# Patient Record
Sex: FEMALE | Race: BLACK OR AFRICAN AMERICAN | NOT HISPANIC OR LATINO | Employment: FULL TIME | URBAN - METROPOLITAN AREA
[De-identification: names, ages, dates, MRNs, and addresses within clinical notes are randomized per-mention and may not be internally consistent; named-entity substitution may affect disease eponyms.]

---

## 2023-10-09 ENCOUNTER — TELEPHONE (OUTPATIENT)
Dept: NEUROLOGY | Facility: CLINIC | Age: 28
End: 2023-10-09

## 2023-10-09 NOTE — TELEPHONE ENCOUNTER
Caller: Pumphrey Caroline    Relationship: Self    Best call back number: [phone]    What was the call regarding: PT MISSED A PHONE CALL FROM DEVYN REGARDING RESCHEDULING HER NEW PATIENT APPT W/ DR. SALCIDO THIS MORNING. PT STATES THE VM SHE RECEIVED WAS UNCLEAR IF SHE NEEDED TO CALL DEVYN BACK TO RESCHEDULE OR IF SHE WOULD RECEIVE A CALL BACK WITH A NEW APPT DATE.    PER SANCHEZ, SENDING ENCOUNTER TO DETERMINE NEXT STEPS.    Do you require a callback: YES, PLEASE.    PLEASE REVIEW AND ADVISE.

## 2023-10-27 ENCOUNTER — OFFICE VISIT (OUTPATIENT)
Dept: NEUROLOGY | Facility: CLINIC | Age: 28
End: 2023-10-27
Payer: COMMERCIAL

## 2023-10-27 VITALS
SYSTOLIC BLOOD PRESSURE: 138 MMHG | DIASTOLIC BLOOD PRESSURE: 76 MMHG | OXYGEN SATURATION: 98 % | BODY MASS INDEX: 43.87 KG/M2 | WEIGHT: 273 LBS | HEART RATE: 110 BPM | HEIGHT: 66 IN

## 2023-10-27 DIAGNOSIS — G93.2 BENIGN INTRACRANIAL HYPERTENSION: Primary | ICD-10-CM

## 2023-10-27 PROCEDURE — 99244 OFF/OP CNSLTJ NEW/EST MOD 40: CPT | Performed by: PSYCHIATRY & NEUROLOGY

## 2023-10-27 RX ORDER — ACETAZOLAMIDE 250 MG/1
250 TABLET ORAL 2 TIMES DAILY
Qty: 60 TABLET | Refills: 3 | Status: SHIPPED | OUTPATIENT
Start: 2023-10-27 | End: 2023-11-26

## 2023-10-27 NOTE — PROGRESS NOTES
Chief Complaint   Patient presents with    Pseudotumor       Patient ID: Olivia Pumphrey is a 28 y.o. female.    HPI: I have had the pleasure of seeing your patient today.  As you may know she is a 28-year-old female here for the assessment of papilledema.  She is being seen at the request of and referred to us by Dr. Sahara Stahl.  Apparently she started having some headaches back in January or February of this year.  She says that they were holocephalic.  They appear to be more focused to the right head region as opposed to the left.  It was a pressure-like sensation.  She was having some mild sensitivity to light however no significant sensitivity to sound.  She had no aura prior to the onset.  She would notice these headaches upon awakening and when she would get out of bed.  The headaches would last for few hours however she says that towards the afternoon and end of the day the headaches typically would resolve.  She does state that food seem to help some.  Water also seem to help her headache.  She had no significant visual changes.  No visual blackouts.  No darkening of her visual fields or dimming.  No spots in her visual fields.  She did see an optometrist because she felt that she was due for an eye exam.  The optometrist felt that there was some evidence for optic nerve swelling therefore she referred her to an ophthalmologist who did confirm optic nerve swelling bilaterally.  She did have an MRI of her brain which did show evidence for CSF within the optic nerve sheaths.  She does acknowledge some weight gain within the past several months.  She also did take oral contraceptive pills until 2019 and was switched to an IUD.  She has no history of head trauma.  No history of consumption of vitamin A derived medication.  No history of chronic steroids.    The following portions of the patient's history were reviewed and updated as appropriate: allergies, current medications, past family history, past  medical history, past social history, past surgical history and problem list.    Review of Systems   Eyes:  Negative for photophobia and visual disturbance.   Neurological:  Positive for headaches. Negative for dizziness, tremors, seizures, syncope, facial asymmetry, speech difficulty, weakness, light-headedness and numbness.      I have reviewed the review of systems above performed by my medical assistant.      Vitals:    10/27/23 0808   BP: 138/76   Pulse: 110   SpO2: 98%       Neurologic Exam     Mental Status   Oriented to person, place, and time.   Registration: recalls 3 of 3 objects. Follows 3 step commands.   Attention: normal. Concentration: normal.   Speech: speech is normal   Level of consciousness: alert  Knowledge: consistent with education (No deficits found.).   Normal comprehension.     Cranial Nerves     CN II   Visual fields full to confrontation.     CN III, IV, VI   Pupils are equal, round, and reactive to light.  Extraocular motions are normal.   CN III: no CN III palsy  CN VI: no CN VI palsy  Nystagmus: none   Diplopia: none    CN V   Facial sensation intact.     CN VII   Facial expression full, symmetric.     CN VIII   CN VIII normal.     CN IX, X   CN IX normal.   CN X normal.     CN XI   CN XI normal.     CN XII   CN XII normal.     Motor Exam   Muscle bulk: normal  Right arm tone: normal  Left arm tone: normal  Right leg tone: normal  Left leg tone: normal    Strength   Right neck flexion: 5/5  Left neck flexion: 5/5  Right neck extension: 5/5  Left neck extension: 5/5  Right deltoid: 5/5  Left deltoid: 5/5  Right biceps: 5/5  Left biceps: 5/5  Right triceps: 5/5  Left triceps: 5/5  Right wrist flexion: 5/5  Left wrist flexion: 5/5  Right wrist extension: 5/5  Left wrist extension: 5/5  Right interossei: 5/5  Left interossei: 5/5  Right abdominals: 5/5  Left abdominals: 5/5  Right iliopsoas: 5/5  Left iliopsoas: 5/5  Right quadriceps: 5/5  Left quadriceps: 5/5  Right hamstrin/5  Left  hamstrin/5  Right glutei: 5/5  Left glutei: 5/5  Right anterior tibial: 5/5  Left anterior tibial: 5/5  Right posterior tibial: 5/5  Left posterior tibial: 5/5  Right peroneal: 5/5  Left peroneal: 5/5  Right gastroc: 5/5  Left gastroc: 5/5    Sensory Exam   Light touch normal.   Vibration normal.   Proprioception normal.   Pinprick normal.     Gait, Coordination, and Reflexes     Gait  Gait: normal    Coordination   Romberg: negative    Tremor   Resting tremor: absent  Intention tremor: absent    Reflexes   Right brachioradialis: 2+  Left brachioradialis: 2+  Right biceps: 2+  Left biceps: 2+  Right triceps: 2+  Left triceps: 2+  Right patellar: 2+  Left patellar: 2+  Right achilles: 2+  Left achilles: 2+  Right : 2+  Left : 2+Station is normal.       Physical Exam  Vitals reviewed.   Constitutional:       General: She is not in acute distress.     Appearance: She is well-developed.   HENT:      Head: Normocephalic and atraumatic.   Eyes:      Extraocular Movements: EOM normal.      Pupils: Pupils are equal, round, and reactive to light.   Cardiovascular:      Rate and Rhythm: Normal rate and regular rhythm.      Heart sounds: Normal heart sounds.   Pulmonary:      Effort: Pulmonary effort is normal. No respiratory distress.      Breath sounds: Normal breath sounds.   Abdominal:      General: Bowel sounds are normal. There is no distension.      Palpations: Abdomen is soft.      Tenderness: There is no abdominal tenderness.   Musculoskeletal:         General: No deformity.      Cervical back: Normal range of motion.   Skin:     General: Skin is warm.      Findings: No rash.   Neurological:      Mental Status: She is oriented to person, place, and time.      Coordination: Romberg Test normal.      Gait: Gait is intact.      Deep Tendon Reflexes:      Reflex Scores:       Tricep reflexes are 2+ on the right side and 2+ on the left side.       Bicep reflexes are 2+ on the right side and 2+ on the left  side.       Brachioradialis reflexes are 2+ on the right side and 2+ on the left side.       Patellar reflexes are 2+ on the right side and 2+ on the left side.       Achilles reflexes are 2+ on the right side and 2+ on the left side.  Psychiatric:         Speech: Speech normal.         Judgment: Judgment normal.         Procedures    Assessment/Plan: Given the recent ophthalmologic Tucker assessment, history of headache and weight gain we will move forward with a lumbar puncture to evaluate opening pressure of CSF.  We will need this lowered to 18 or less if elevated.  I would like to schedule her for a CTV.  I would also like to send a prescription for Diamox 250 mg twice daily.  We did talk about this prescription.  I would like for her to start the medicine after we discussed the results of her spinal tap.  If the pressure is within normal limits then we will likely not need to initiate that.  We will see her back in approximately 4 months.  She can call to discuss results of the CTV.       Diagnoses and all orders for this visit:    1. Benign intracranial hypertension (Primary)  -     IR Lumbar Puncture Diagnosis; Future  -     Notify Provider if Patient Taking Blood Thinning Agents; Standing  -     Check & Record Opening & Closing Pressures (LP); Standing  -     When Administering Intrathecal Chemotherapy, Remove Equivalent Volumes of CSF Prior to Instillation. Instill Chemotherapy at a Rate of 1 cc per Minute; Standing  -     No CSF Labs Needed; Standing  -     CT venogram head; Future  -     acetaZOLAMIDE (DIAMOX) 250 MG tablet; Take 1 tablet by mouth 2 (Two) Times a Day for 30 days.  Dispense: 60 tablet; Refill: 3           Javed Dickey II, MD

## 2023-10-27 NOTE — LETTER
October 27, 2023       No Recipients    Patient: Olivia Pumphrey   YOB: 1995   Date of Visit: 10/27/2023     Dear Sahara Stahl, DO:       Thank you for referring Olivia Pumphrey to me for evaluation. Below are the relevant portions of my assessment and plan of care.    If you have questions, please do not hesitate to call me. I look forward to following Caroline along with you.         Sincerely,        Javed Dickey II, MD        CC:   No Recipients    Javed Dickey II, MD  10/27/23 0919  Sign when Signing Visit  Chief Complaint   Patient presents with   • Pseudotumor       Patient ID: Olivia Pumphrey is a 28 y.o. female.    HPI: I have had the pleasure of seeing your patient today.  As you may know she is a 28-year-old female here for the assessment of papilledema.  She is being seen at the request of and referred to us by Dr. Sahara Stahl.  Apparently she started having some headaches back in January or February of this year.  She says that they were holocephalic.  They appear to be more focused to the right head region as opposed to the left.  It was a pressure-like sensation.  She was having some mild sensitivity to light however no significant sensitivity to sound.  She had no aura prior to the onset.  She would notice these headaches upon awakening and when she would get out of bed.  The headaches would last for few hours however she says that towards the afternoon and end of the day the headaches typically would resolve.  She does state that food seem to help some.  Water also seem to help her headache.  She had no significant visual changes.  No visual blackouts.  No darkening of her visual fields or dimming.  No spots in her visual fields.  She did see an optometrist because she felt that she was due for an eye exam.  The optometrist felt that there was some evidence for optic nerve swelling therefore she referred her to an ophthalmologist who did confirm optic nerve swelling  bilaterally.  She did have an MRI of her brain which did show evidence for CSF within the optic nerve sheaths.  She does acknowledge some weight gain within the past several months.  She also did take oral contraceptive pills until 2019 and was switched to an IUD.  She has no history of head trauma.  No history of consumption of vitamin A derived medication.  No history of chronic steroids.    The following portions of the patient's history were reviewed and updated as appropriate: allergies, current medications, past family history, past medical history, past social history, past surgical history and problem list.    Review of Systems   Eyes:  Negative for photophobia and visual disturbance.   Neurological:  Positive for headaches. Negative for dizziness, tremors, seizures, syncope, facial asymmetry, speech difficulty, weakness, light-headedness and numbness.      I have reviewed the review of systems above performed by my medical assistant.      Vitals:    10/27/23 0808   BP: 138/76   Pulse: 110   SpO2: 98%       Neurologic Exam     Mental Status   Oriented to person, place, and time.   Registration: recalls 3 of 3 objects. Follows 3 step commands.   Attention: normal. Concentration: normal.   Speech: speech is normal   Level of consciousness: alert  Knowledge: consistent with education (No deficits found.).   Normal comprehension.     Cranial Nerves     CN II   Visual fields full to confrontation.     CN III, IV, VI   Pupils are equal, round, and reactive to light.  Extraocular motions are normal.   CN III: no CN III palsy  CN VI: no CN VI palsy  Nystagmus: none   Diplopia: none    CN V   Facial sensation intact.     CN VII   Facial expression full, symmetric.     CN VIII   CN VIII normal.     CN IX, X   CN IX normal.   CN X normal.     CN XI   CN XI normal.     CN XII   CN XII normal.     Motor Exam   Muscle bulk: normal  Right arm tone: normal  Left arm tone: normal  Right leg tone: normal  Left leg tone:  normal    Strength   Right neck flexion: 5/5  Left neck flexion: 5/5  Right neck extension: 5/5  Left neck extension: 5/5  Right deltoid: 5/5  Left deltoid: 5/5  Right biceps: 5/5  Left biceps: 5/5  Right triceps: 5/5  Left triceps: 5/5  Right wrist flexion: 5/5  Left wrist flexion: 5/5  Right wrist extension: 5/5  Left wrist extension: 5/5  Right interossei: 5/5  Left interossei: 5/5  Right abdominals: 5/5  Left abdominals: 5/5  Right iliopsoas: 5/5  Left iliopsoas: 5/5  Right quadriceps: 5/5  Left quadriceps: 5/5  Right hamstrin/5  Left hamstrin/5  Right glutei: 5/5  Left glutei: 5/5  Right anterior tibial: 5/5  Left anterior tibial: 5/5  Right posterior tibial: 5/5  Left posterior tibial: 5/5  Right peroneal: 5/5  Left peroneal: 5/5  Right gastroc: 5/5  Left gastroc: 5/5    Sensory Exam   Light touch normal.   Vibration normal.   Proprioception normal.   Pinprick normal.     Gait, Coordination, and Reflexes     Gait  Gait: normal    Coordination   Romberg: negative    Tremor   Resting tremor: absent  Intention tremor: absent    Reflexes   Right brachioradialis: 2+  Left brachioradialis: 2+  Right biceps: 2+  Left biceps: 2+  Right triceps: 2+  Left triceps: 2+  Right patellar: 2+  Left patellar: 2+  Right achilles: 2+  Left achilles: 2+  Right : 2+  Left : 2+Station is normal.       Physical Exam  Vitals reviewed.   Constitutional:       General: She is not in acute distress.     Appearance: She is well-developed.   HENT:      Head: Normocephalic and atraumatic.   Eyes:      Extraocular Movements: EOM normal.      Pupils: Pupils are equal, round, and reactive to light.   Cardiovascular:      Rate and Rhythm: Normal rate and regular rhythm.      Heart sounds: Normal heart sounds.   Pulmonary:      Effort: Pulmonary effort is normal. No respiratory distress.      Breath sounds: Normal breath sounds.   Abdominal:      General: Bowel sounds are normal. There is no distension.      Palpations: Abdomen  is soft.      Tenderness: There is no abdominal tenderness.   Musculoskeletal:         General: No deformity.      Cervical back: Normal range of motion.   Skin:     General: Skin is warm.      Findings: No rash.   Neurological:      Mental Status: She is oriented to person, place, and time.      Coordination: Romberg Test normal.      Gait: Gait is intact.      Deep Tendon Reflexes:      Reflex Scores:       Tricep reflexes are 2+ on the right side and 2+ on the left side.       Bicep reflexes are 2+ on the right side and 2+ on the left side.       Brachioradialis reflexes are 2+ on the right side and 2+ on the left side.       Patellar reflexes are 2+ on the right side and 2+ on the left side.       Achilles reflexes are 2+ on the right side and 2+ on the left side.  Psychiatric:         Speech: Speech normal.         Judgment: Judgment normal.         Procedures    Assessment/Plan: Given the recent ophthalmologic Tucker assessment, history of headache and weight gain we will move forward with a lumbar puncture to evaluate opening pressure of CSF.  We will need this lowered to 18 or less if elevated.  I would like to schedule her for a CTV.  I would also like to send a prescription for Diamox 250 mg twice daily.  We did talk about this prescription.  I would like for her to start the medicine after we discussed the results of her spinal tap.  If the pressure is within normal limits then we will likely not need to initiate that.  We will see her back in approximately 4 months.  She can call to discuss results of the CTV.       Diagnoses and all orders for this visit:    1. Benign intracranial hypertension (Primary)  -     IR Lumbar Puncture Diagnosis; Future  -     Notify Provider if Patient Taking Blood Thinning Agents; Standing  -     Check & Record Opening & Closing Pressures (LP); Standing  -     When Administering Intrathecal Chemotherapy, Remove Equivalent Volumes of CSF Prior to Instillation. Instill  Chemotherapy at a Rate of 1 cc per Minute; Standing  -     No CSF Labs Needed; Standing  -     CT venogram head; Future  -     acetaZOLAMIDE (DIAMOX) 250 MG tablet; Take 1 tablet by mouth 2 (Two) Times a Day for 30 days.  Dispense: 60 tablet; Refill: 3           Javed Dickey II, MD

## 2024-02-19 ENCOUNTER — HOSPITAL ENCOUNTER (OUTPATIENT)
Dept: GENERAL RADIOLOGY | Facility: HOSPITAL | Age: 29
Discharge: HOME | End: 2024-02-19
Payer: COMMERCIAL

## 2024-02-19 ENCOUNTER — HOSPITAL ENCOUNTER (OUTPATIENT)
Dept: CT IMAGING | Facility: HOSPITAL | Age: 29
Discharge: HOME | End: 2024-02-19
Payer: COMMERCIAL

## 2024-02-19 VITALS
SYSTOLIC BLOOD PRESSURE: 141 MMHG | WEIGHT: 245 LBS | HEIGHT: 66 IN | RESPIRATION RATE: 16 BRPM | BODY MASS INDEX: 39.37 KG/M2 | OXYGEN SATURATION: 99 % | TEMPERATURE: 98 F | HEART RATE: 74 BPM | DIASTOLIC BLOOD PRESSURE: 90 MMHG

## 2024-02-19 DIAGNOSIS — G93.2 BENIGN INTRACRANIAL HYPERTENSION: ICD-10-CM

## 2024-02-19 PROCEDURE — 70496 CT ANGIOGRAPHY HEAD: CPT

## 2024-02-19 PROCEDURE — 25510000001 IOPAMIDOL 61 % SOLUTION: Performed by: PSYCHIATRY & NEUROLOGY

## 2024-02-19 PROCEDURE — 25010000002 LIDOCAINE 1 % SOLUTION: Performed by: RADIOLOGY

## 2024-02-19 RX ORDER — LIDOCAINE HYDROCHLORIDE 10 MG/ML
10 INJECTION, SOLUTION INFILTRATION; PERINEURAL ONCE
Status: COMPLETED | OUTPATIENT
Start: 2024-02-19 | End: 2024-02-19

## 2024-02-19 RX ORDER — ALPRAZOLAM 0.5 MG/1
0.5 TABLET ORAL ONCE
Status: COMPLETED | OUTPATIENT
Start: 2024-02-19 | End: 2024-02-19

## 2024-02-19 RX ADMIN — ALPRAZOLAM 0.5 MG: 0.5 TABLET ORAL at 12:28

## 2024-02-19 RX ADMIN — IOPAMIDOL 90 ML: 612 INJECTION, SOLUTION INTRAVENOUS at 11:25

## 2024-02-19 RX ADMIN — LIDOCAINE HYDROCHLORIDE 8 ML: 10 INJECTION, SOLUTION INFILTRATION; PERINEURAL at 13:18

## 2024-02-19 NOTE — NURSING NOTE
Pt IV removed, pt taken to main entrance via W/C and released to care of her family member. NAD noted. Pt denies pain at present time.

## 2024-02-19 NOTE — DISCHARGE INSTRUCTIONS
EDUCATION /DISCHARGE INSTRUCTION   A lumbar puncture involves insertion of a sterile needle into the spinal canal by the physician   This procedure is performed for several reasons: to detect increased intracranial pressure, the presence of blood in cerebrospinal fluid (CFS), to obtain CSF specimens for laboratory studies, to administer drugs and to relieve pressure by removing CSF.    You will lie on your stomach with a pillow under your stomach.  This opens the vertebral space to allow access to the spinal needle.  The physician will sterilize the area using antiseptic solution and numb the area where the spinal needle will be placed.  If CSF is being removed for specimen, you may be asked to push or beardown to assist with the flow of the CSF. When the procedure is complete, a band aid will be placed over the injection site and you will be taken to the recovery area.    POTENTIAL RISKS OF A LUMBAR PUNCTURE INCLUDE BUT ARE NOT LIMITED TO:  *  Headache    *  Swelling at puncture site  *  Bleeding into the spinal canal *  Temporary difficulty urinating  *  Leakage of CSF   *  Fever  *  Pain caused by nerve irritation    BENEFIT OF PROCEDURE:  This is the only way to obtain spinal fluid or relieve pressure due to increased CSF.  There is no alternative.  THIS EDUCATION INFORMATION WAS REVIEWED PRIOR TO PROCEDURE AND CONSENT. Patient initials__________________Time_________________    FOLLOWING A LUMBAR PUNCTURE:  *  Drink plenty of liquids -  Caffeine is recommended   *  Lie down flat for the next 8 hours.  If you get a headache, lie down flat for 24 hours, continue to force fluids and call your doctor if  the headache persists.  *  Go straight home.  DO NOT DRIVE    CALL YOUR DOCTOR IF YOU HAVE:  *  A severe headache that will not go away  *  Redness, swelling or drainage from the puncture site  *  Neck stiffness, numbness or weakness  *  Any new or severe symptoms    Following the procedure, you should continue to  take all of your medications as directed by your primary physician unless otherwise instructed.  There have been no changes to the medications you provided us (with the following exceptions if applicable):    Resume taking your blood thinner or Aspirin on_____________________    YOU ARE THE MOST IMPORTANT FACTOR IN YOUR RECOVERY.  IF YOU HAVE QUESTIONS OR CONCERNS PLEASE CALL THE RADIOLOGY NURSES AT [phone]

## 2024-02-19 NOTE — H&P
Name: Olivia Pumphrey ADMIT: 2024   : 1995  PCP: Provider, No Known    MRN: [id number] LOS: 0 days   AGE/SEX: 28 y.o. female  ROOM: Room/bed info not found       Chief complaint  HA'S, PROBABLE PAPILLEDEMA    Present Illness or Internal History:  Patient is a 28 y.o. female presents with SAME.     Past Surgical History:  History reviewed. No pertinent surgical history.    Past Medical History:  History reviewed. No pertinent past medical history.    Home Medications:  (Not in a hospital admission)      Allergies:  Penicillins    Family History:  No family history on file.    Social History:  Social History     Tobacco Use    Smoking status: Never   Vaping Use    Vaping Use: Never used   Substance Use Topics    Alcohol use: Defer    Drug use: Defer        Objective     Physical Exam:      General Appearance:  Alert, cooperative, in no acute distress   Head:  Normocephalic, without obvious abnormality, atraumatic   Eyes:  Lids and lashes normal, conjunctivae and sclerae normal, no icterus, no pallor, corneas clear, PERRLA   Ears:  Ears appear intact with no abnormalities noted   Throat:  No oral lesions, no thrush, oral mucosa moist   Neck:  No adenopathy, supple, trachea midline, no thyromegaly, no carotid bruit, no JVD   Back:  No kyphosis present, no scoliosis present, no skin lesions, erythema or scars, no tenderness to percussion, or palpation, range of motion normal   Lungs:  Clear to auscultation,respirations regular, even and unlabored    Heart:  Regular rhythm and normal rate, normal S1 and S2, no murmur, no gallop, no rub, no click   Breast Exam:  Deferred   Abdomen:  Normal bowel sounds, no masses, no organomegaly, soft non-tender, non-distended, no guarding, no rebound tenderness   Genitalia:  Deferred   Extremities:  Moves all extremities well, no edema, no cyanosis, no redness   Pulses:  Pulses palpable and equal bilaterally   Skin:  No bleeding, bruising or rash   Lymph nodes:  No  palpable adenopathy   Neurologic:  Cranial nerves 2 - 12 grossly intact, sensation intact, DTR present and equal bilaterally       Vital Signs  Temp:  [98 °F (36.7 °C)] 98 °F (36.7 °C)  Heart Rate:  [96] 96  Resp:  [16] 16  BP: (145)/(90) 145/90    Anticipated Surgical Procedure:  LP FOR DIAGNOSIS AND THERAPY    The risks, benefits and alternatives of this procedure have been discussed with the patient or responsible party: Yes        Cresencio Aldridge MD  02/19/24  13:02 EST

## 2024-02-20 ENCOUNTER — TELEPHONE (OUTPATIENT)
Dept: NEUROLOGY | Facility: CLINIC | Age: 29
End: 2024-02-20
Payer: COMMERCIAL

## 2024-02-20 ENCOUNTER — TELEPHONE (OUTPATIENT)
Dept: INTERVENTIONAL RADIOLOGY/VASCULAR | Facility: HOSPITAL | Age: 29
End: 2024-02-20
Payer: COMMERCIAL

## 2024-02-20 NOTE — TELEPHONE ENCOUNTER
Caller: JESSIE Knight call back number: [phone]     What was the call regarding: PT RETURNED CALL SAID YOU ASKED IF ON RX ACETAZOLAMIDE ?     SHE HAS NOT TAKEN YET NEEDS TO KNOW WHEN TO START?       Is it okay if the provider responds through MyChart: CALL       PLEASE ADVISE

## 2024-02-22 NOTE — TELEPHONE ENCOUNTER
[facility] for pt, informed them that Dr Dickey has reviewed the results of the lumbar puncture and he would like pt to start on the Diamox.

## 2024-06-07 DIAGNOSIS — G93.2 BENIGN INTRACRANIAL HYPERTENSION: ICD-10-CM

## 2024-06-07 RX ORDER — ACETAZOLAMIDE 250 MG/1
250 TABLET ORAL 2 TIMES DAILY
Qty: 60 TABLET | Refills: 2 | Status: SHIPPED | OUTPATIENT
Start: 2024-06-07

## 2024-11-22 DIAGNOSIS — G93.2 BENIGN INTRACRANIAL HYPERTENSION: ICD-10-CM

## 2024-11-22 RX ORDER — ACETAZOLAMIDE 250 MG/1
250 TABLET ORAL 2 TIMES DAILY
Qty: 60 TABLET | Refills: 0 | Status: SHIPPED | OUTPATIENT
Start: 2024-11-22

## 2024-11-22 NOTE — TELEPHONE ENCOUNTER
30 day rx refill for Diamox sent to [facility] on [address]   Pt will need follow up for future refills.

## 2024-12-30 DIAGNOSIS — G93.2 BENIGN INTRACRANIAL HYPERTENSION: ICD-10-CM

## 2024-12-30 RX ORDER — ACETAZOLAMIDE 250 MG/1
250 TABLET ORAL 2 TIMES DAILY
Qty: 60 TABLET | Refills: 0 | OUTPATIENT
Start: 2024-12-30

## 2024-12-30 NOTE — TELEPHONE ENCOUNTER
Pt will need a f/u appt for any further refills. Yearly f/u is required for med management, pt has not been seen since 10/27/23.